# Patient Record
Sex: FEMALE | Race: BLACK OR AFRICAN AMERICAN | Employment: UNEMPLOYED | URBAN - METROPOLITAN AREA
[De-identification: names, ages, dates, MRNs, and addresses within clinical notes are randomized per-mention and may not be internally consistent; named-entity substitution may affect disease eponyms.]

---

## 2017-08-10 ENCOUNTER — HOSPITAL ENCOUNTER (OUTPATIENT)
Dept: SLEEP MEDICINE | Age: 37
Discharge: HOME | End: 2017-08-10
Payer: COMMERCIAL

## 2017-08-10 PROCEDURE — 95811 POLYSOM 6/>YRS CPAP 4/> PARM: CPT

## 2017-09-13 ENCOUNTER — HOSPITAL ENCOUNTER (OUTPATIENT)
Dept: ULTRASOUND IMAGING | Age: 37
Discharge: HOME | End: 2017-09-13
Attending: FAMILY MEDICINE
Payer: COMMERCIAL

## 2017-09-13 DIAGNOSIS — E04.9 GOITER: ICD-10-CM

## 2017-09-13 PROCEDURE — 76536 US EXAM OF HEAD AND NECK: CPT

## 2017-09-13 NOTE — PROGRESS NOTES
Please tell patient that her thyroid ultrasound showed nodules and they need further evaluation. I have placed referral to specialist for it.     Felipe Chin MD

## 2017-09-20 PROBLEM — R94.31 ABNORMAL EKG: Status: ACTIVE | Noted: 2017-09-20

## 2017-09-20 PROBLEM — R55 SYNCOPE: Status: ACTIVE | Noted: 2017-09-20

## 2017-10-03 ENCOUNTER — HOSPITAL ENCOUNTER (OUTPATIENT)
Dept: ULTRASOUND IMAGING | Age: 37
Discharge: HOME | End: 2017-10-03
Attending: INTERNAL MEDICINE
Payer: COMMERCIAL

## 2017-10-03 DIAGNOSIS — E04.2 MULTINODULAR GOITER: ICD-10-CM

## 2017-10-03 PROCEDURE — 88173 CYTOPATH EVAL FNA REPORT: CPT | Performed by: INTERNAL MEDICINE

## 2017-10-03 PROCEDURE — 10022 US GUIDE FINE NDL ASP THYROID: CPT

## 2017-10-03 PROCEDURE — 74011000250 HC RX REV CODE- 250: Performed by: PHYSICIAN ASSISTANT

## 2017-10-03 PROCEDURE — 88305 TISSUE EXAM BY PATHOLOGIST: CPT | Performed by: INTERNAL MEDICINE

## 2017-10-03 PROCEDURE — 60100 BIOPSY OF THYROID: CPT

## 2017-10-03 RX ORDER — LIDOCAINE HYDROCHLORIDE 20 MG/ML
.5-2 INJECTION, SOLUTION INFILTRATION; PERINEURAL ONCE
Status: COMPLETED | OUTPATIENT
Start: 2017-10-03 | End: 2017-10-03

## 2017-10-03 RX ADMIN — LIDOCAINE HYDROCHLORIDE 100 MG: 20 INJECTION, SOLUTION INFILTRATION; PERINEURAL at 11:12

## 2017-10-03 NOTE — DISCHARGE INSTRUCTIONS
[address]  Department of Interventional Radiology  [facility]  [phone] Office  [phone] Fax    BIOPSY DISCHARGE INSTRUCTIONS    General Instructions: Thyroid Biopsy     A biopsy is the removal of a small piece of tissue for microscopic examination or testing. Healthy tissue can be obtained for the purpose of tissue-type matching for transplants. Unhealthy tissues are more commonly biopsied to diagnose disease. Lung Biopsy:     A needle lung biopsy is performed when there is a mass discovered in the lung area. The most serious risk is infection, bleeding, and pneumothorax (a collapsed lung). Signs of pneumothorax include shortness of breath, rapid heart rate, and blueness of the skin. If any of these occur, call 911. Liver Biopsy: This test helps detect cancer, infections, and the cause of an enlargement of the liver or elevated liver enzymes. It also helps to diagnose a number of liver diseases. The pain associated with the procedure may be felt in the shoulder. The risks include internal bleeding, pneumothorax, and injury to the surrounding organs. Renal Biopsy: This procedure is sometimes done to evaluate a transplanted kidney. It is also used to evaluate unexplained decrease in kidney function, blood, or protein in the urine. You may see bright red blood in the urine the first 24 hours after the test. If the bleeding lasts longer, you need to call your doctor. There is a risk of infection and bleeding into the muscle, which may cause soreness. Spinal Biopsy: This test is sometimes done in conjunction with other procedures. Your back will be sore, as we are taking a small sample of bone, which is slightly more difficult to sample than tissue. General Biopsy:     A mass can grow in any area of the body, and we are taking a specimen as ordered by your doctor. The risks are the same.  They include bleeding, pain, and infection. Home Care Instructions: You may resume your regular diet and medication regimen. Do not drink alcohol, drive, or make any important legal decisions in the next 24 hours. Do not lift anything heavier than a gallon of milk until the soreness goes away. You may use over the counter acetaminophen or ibuprofen for the soreness. You may apply an ice pack to the affected area for 20-30 minutes at time for the first 24 hours. After that, you may apply a heat pack. Call If: You should call your Physician and/or the Radiology Nurse if you have any questions or concerns about the biopsy site. Call if you should have increased pain, fever, redness, drainage, or bleeding more than a small spot on the bandage. Follow-Up Instructions: Please see your ordering doctor as he/she has requested. To Reach Us: If you have any questions about your procedure, please call the Interventional Radiology department at [phone]. After business hours (5pm) and weekends, call the answering service at [phone] and ask for the Radiologist on call to be paged. [address] Interventional Radiology General Nurse Discharge    After general anesthesia or intravenous sedation, for 24 hours or while taking prescription Narcotics:  · Limit your activities  · Do not drive and operate hazardous machinery  · Do not make important personal or business decisions  · Do  not drink alcoholic beverages  · If you have not urinated within 8 hours after discharge, please contact your surgeon on call. * Please give a list of your current medications to your Primary Care Provider. * Please update this list whenever your medications are discontinued, doses are     changed, or new medications (including over-the-counter products) are added. * Please carry medication information at all times in case of emergency situations.     These are general instructions for a healthy lifestyle:    No smoking/ No tobacco products/ Avoid exposure to second hand smoke  Surgeon General's Warning:  Quitting smoking now greatly reduces serious risk to your health. Obesity, smoking, and sedentary lifestyle greatly increases your risk for illness  A healthy diet, regular physical exercise & weight monitoring are important for maintaining a healthy lifestyle    You may be retaining fluid if you have a history of heart failure or if you experience any of the following symptoms:  Weight gain of 3 pounds or more overnight or 5 pounds in a week, increased swelling in our hands or feet or shortness of breath while lying flat in bed. Please call your doctor as soon as you notice any of these symptoms; do not wait until your next office visit. Recognize signs and symptoms of STROKE:  F-face looks uneven    A-arms unable to move or move unevenly    S-speech slurred or non-existent    T-time-call 911 as soon as signs and symptoms begin-DO NOT go       Back to bed or wait to see if you get better-TIME IS BRAIN.             Patient Signature:  Date: 10/3/2017  Discharging Nurse: Henry Espinosa

## 2017-10-03 NOTE — IP AVS SNAPSHOT
Mireya Carreno 
 
 
 [address] 
[phone] Patient: Jolene Lai MRN: [id number] UBY:[birth year] You are allergic to the following No active allergies Recent Documentation Smoking Status Never Smoker Emergency Contacts Name Discharge Info Relation Home Work Mobile Rachel Morales  Mother [14] [phone] Regina Moran  Other Relative [6] [phone] Sabrina Berumen  Other Relative [6] [phone] About your hospitalization You were admitted on:  October 3, 2017 You last received care in the:  [facility] RAD[facility] ULTRASOUND You were discharged on:  October 3, 2017 Unit phone number:  [phone] Why you were hospitalized Your primary diagnosis was:  Not on File Providers Seen During Your Hospitalizations Provider Role Specialty Primary office phone Godwin Drummond MD Attending Provider Endocrinology [phone] Your Primary Care Physician (PCP) Primary Care Physician Office Phone Office Fax [address] [phone] Follow-up Information None Your Appointments Tuesday October 03, 2017 11:00 AM EDT  
US GUIDED BIOPSY NECK THYROID PRC NDL with D US LOGIC 7 UNIT 2, SFD NURSING, SFD IR PA RESOURCE 2  
[facility] RADIOLOGY ULTRASOUND ([address] [place] [address]) [address]  
[phone] Interventional Radiology procedure completed in Ultrasound. Referring Physicians: 1) Fax H&P/recent office notes and lab work, no older than 30 days (CBC, BMP, PT/PTT) to Interventional Radiology to facilitate prompt scheduling.  2)Patients with contrast dye allergies must be pre medicated prior to arrival. 3) Obtain clearance to hold blood thinners from prescribing Physician and give Patient instructions prior to arrival. 4)Hold oral diabetic medications the day of the procedure. If Insulin is required, take 1/2 dose the day of the procedure. 5) Pt should not eat or drink anything past midnight 6) Pt to arrive 1 hour to 1.5 hours early depending upon sedation method. 7) Responsible adult  required to drive Patient home after recovery period. Recovery period can vary depending on sedation and patient condition. 8) Requires approval from Radiologist prior to scheduling 9) Interventional Radiology Scheduling can be contacted at [phone] Wednesday October 04, 2017  9:20 AM EDT Return appointment with [address] [place] [address] [address] ([facility]) [facility] [address] [place] [address] [facility]  
[phone] Friday October 06, 2017 10:15 AM EDT  
US HEAD NECK SOFT TISSUE with SFD US LOGIC 7 UNIT 1  
[facility] RADIOLOGY ULTRASOUND ([address] [place] [address]) [address] [place] [address]  
[phone] PATIENT ARRIVAL Please report 30 minutes early to check in. Monday October 16, 2017  8:30 AM EDT  
ECHOCARDIOGRAM with ECHO [address] [facility] CARDIOLOGY [place] OFFICE ([address] [place] [address]) [address] [place]  
[address] [place]  
[phone] Monday October 23, 2017  8:15 AM EDT Office Visit with Lauren Parnell DO  
[facility] CARDIOLOGY [place] OFFICE ([address] [place] [address]) [address] 
[address] [place] [address]  
[phone] Tuesday October 24, 2017  9:15 AM EDT Wellness Visit (26-64) with Daryl Calabrese MD  
[address] [facility] ([address] [place] [address]) [address] [place] [address]  
[phone] Current Discharge Medication List  
  
ASK your doctor about these medications Dose & Instructions Dispensing Information Comments Morning Noon Evening Bedtime  
 ferrous sulfate 325 mg (65 mg iron) EC tablet Commonly known as:  IRON Your last dose was: Your next dose is:    
   
   
 Dose:  325 mg Take 1 Tab by mouth two (2) times a day. Quantity:  60 Tab Refills:  3 Discharge Instructions [place] [address] Department of Interventional Radiology [facility] 
[phone] Office 
[phone] Fax BIOPSY DISCHARGE INSTRUCTIONS General Instructions: Thyroid Biopsy A biopsy is the removal of a small piece of tissue for microscopic examination or testing. Healthy tissue can be obtained for the purpose of tissue-type matching for transplants. Unhealthy tissues are more commonly biopsied to diagnose disease. Lung Biopsy: A needle lung biopsy is performed when there is a mass discovered in the lung area. The most serious risk is infection, bleeding, and pneumothorax (a collapsed lung). Signs of pneumothorax include shortness of breath, rapid heart rate, and blueness of the skin. If any of these occur, call 911. Liver Biopsy: This test helps detect cancer, infections, and the cause of an enlargement of the liver or elevated liver enzymes. It also helps to diagnose a number of liver diseases. The pain associated with the procedure may be felt in the shoulder. The risks include internal bleeding, pneumothorax, and injury to the surrounding organs. Renal Biopsy: This procedure is sometimes done to evaluate a transplanted kidney. It is also used to evaluate unexplained decrease in kidney function, blood, or protein in the urine. You may see bright red blood in the urine the first 24 hours after the test. If the bleeding lasts longer, you need to call your doctor. There is a risk of infection and bleeding into the muscle, which may cause soreness. Spinal Biopsy: This test is sometimes done in conjunction with other procedures.  Your back will be sore, as we are taking a small sample of bone, which is slightly more difficult to sample than tissue. General Biopsy: 
   A mass can grow in any area of the body, and we are taking a specimen as ordered by your doctor. The risks are the same. They include bleeding, pain, and infection. Home Care Instructions: You may resume your regular diet and medication regimen. Do not drink alcohol, drive, or make any important legal decisions in the next 24 hours. Do not lift anything heavier than a gallon of milk until the soreness goes away. You may use over the counter acetaminophen or ibuprofen for the soreness. You may apply an ice pack to the affected area for 20-30 minutes at time for the first 24 hours. After that, you may apply a heat pack. Call If: You should call your Physician and/or the Radiology Nurse if you have any questions or concerns about the biopsy site. Call if you should have increased pain, fever, redness, drainage, or bleeding more than a small spot on the bandage. Follow-Up Instructions: Please see your ordering doctor as he/she has requested. To Reach Us: If you have any questions about your procedure, please call the Interventional Radiology department at [phone]. After business hours (5pm) and weekends, call the answering service at [phone] and ask for the Radiologist on call to be paged. 1102 [facility] [address] Department of Interventional Radiology [address] [place] [address] [address] [facility] 
[phone] Office 
[phone] Fax TRANSJUGULAR BIOPSY DISCHARGE INSTRUCTIONS General Information: The transjugular liver biopsy is a procedure that is done to obtain a liver biopsy through the portal vein. A biopsy is the removal of a small piece of tissue for microscopic examination or testing. Healthy tissue can be obtained for the purpose of tissue-type matching for transplants. Unhealthy tissues are more commonly biopsied to diagnose disease. The liver biopsy helps detect cancer, infections, and the cause of an enlargement of the liver or elevated liver enzymes. It also helps to diagnose a number of liver diseases. Home Care Instructions: You can resume your regular diet and medication regimen. Do not drink alcohol, drive, or make any important legal decisions in the next 24 hours. Do not lift anything heavier than a gallon of milk, or do anything strenuous for the next 24 hours. You will notice a dressing on your neck. This was the site of the insertion for the procedure. This dressing may be removed in 24 hours. You may take a shower after the bandage comes off. Do not take a bath, swim or immerse yourself in water until the wound on your neck has completely healed. Call If: 
 You should call your Physician and/or Radiology Nurse if you have any bleeding other than a small spot on your bandage. Call if you have any signs of infection, fever, or increased pain at the site. Call if you have any questions of how to take care of your wound. Call if you notice your abdomen swelling. You may still have to come in for the paracentesis, but you should not have to come in as often. Follow-Up Instructions: Please see your ordering doctor as he/she has requested. To Reach Us: Interventional Radiology General Nurse Discharge After general anesthesia or intravenous sedation, for 24 hours or while taking prescription Narcotics: · Limit your activities · Do not drive and operate hazardous machinery · Do not make important personal or business decisions · Do  not drink alcoholic beverages · If you have not urinated within 8 hours after discharge, please contact your surgeon on call. * Please give a list of your current medications to your Primary Care Provider.  
* Please update this list whenever your medications are discontinued, doses are 
 changed, or new medications (including over-the-counter products) are added. * Please carry medication information at all times in case of emergency situations. These are general instructions for a healthy lifestyle: No smoking/ No tobacco products/ Avoid exposure to second hand smoke Surgeon General's Warning:  Quitting smoking now greatly reduces serious risk to your health. Obesity, smoking, and sedentary lifestyle greatly increases your risk for illness A healthy diet, regular physical exercise & weight monitoring are important for maintaining a healthy lifestyle You may be retaining fluid if you have a history of heart failure or if you experience any of the following symptoms:  Weight gain of 3 pounds or more overnight or 5 pounds in a week, increased swelling in our hands or feet or shortness of breath while lying flat in bed. Please call your doctor as soon as you notice any of these symptoms; do not wait until your next office visit. Recognize signs and symptoms of STROKE: 
F-face looks uneven A-arms unable to move or move unevenly S-speech slurred or non-existent T-time-call 911 as soon as signs and symptoms begin-DO NOT go Back to bed or wait to see if you get better-TIME IS BRAIN. Patient Signature: 
Date: 10/3/2017 Discharging Nurse: Popeye Lovell Discharge Orders None Introducing [facility] SERVICES! Dear Haritha Finn: Thank you for requesting a [employer] account. Our records indicate that you already have an active [employer] account. You can access your account anytime at [email or web address] Did you know that you can access your hospital and ER discharge instructions at any time in [employer]? You can also review all of your test results from your hospital stay or ER visit. Additional Information If you have questions, please visit the Frequently Asked Questions section of the [employer] website at [email or web address] Remember, MyChart is NOT to be used for urgent needs. For medical emergencies, dial 911. Now available from your iPhone and Android! General Information Please provide this summary of care documentation to your next provider. Patient Signature:  ____________________________________________________________ Date:  ____________________________________________________________  
  
Janyth Mins Provider Signature:  ____________________________________________________________ Date:  ____________________________________________________________

## 2017-10-03 NOTE — IP AVS SNAPSHOT
Akila Reeves 
 
 
 [address] 
[phone] Patient: Janeth Reynolds MRN: [id number] OXK:[birth year] Current Discharge Medication List  
  
ASK your doctor about these medications Dose & Instructions Dispensing Information Comments Morning Noon Evening Bedtime  
 ferrous sulfate 325 mg (65 mg iron) EC tablet Commonly known as:  IRON Your last dose was: Your next dose is:    
   
   
 Dose:  325 mg Take 1 Tab by mouth two (2) times a day. Quantity:  60 Tab Refills:  3

## 2017-10-03 NOTE — PROGRESS NOTES
Recovery period without difficulty. Pt alert and oriented and denies pain. Dressing is clean, dry, and intact. Reviewed discharge instructions with patient and she verbalized understanding. Pt escorted to lobby discharge area.

## 2017-10-03 NOTE — PROGRESS NOTES
Spoke to Brittni in [facility], told her we are ready with thyroid biopsy

## 2017-10-04 PROBLEM — Z72.821 POOR SLEEP HYGIENE: Status: ACTIVE | Noted: 2017-10-04

## 2017-10-04 PROBLEM — G47.10 HYPERSOMNIA: Status: ACTIVE | Noted: 2017-10-04

## 2017-10-04 PROBLEM — R09.02 HYPOXIA: Status: ACTIVE | Noted: 2017-10-04

## 2017-10-06 ENCOUNTER — HOSPITAL ENCOUNTER (OUTPATIENT)
Dept: ULTRASOUND IMAGING | Age: 37
Discharge: HOME | End: 2017-10-06
Attending: INTERNAL MEDICINE
Payer: COMMERCIAL

## 2017-10-06 DIAGNOSIS — E04.2 MULTINODULAR GOITER: ICD-10-CM

## 2017-10-06 PROCEDURE — 76536 US EXAM OF HEAD AND NECK: CPT

## 2018-08-03 ENCOUNTER — HOSPITAL ENCOUNTER (OUTPATIENT)
Dept: ULTRASOUND IMAGING | Age: 38
Discharge: HOME | End: 2018-08-03
Attending: INTERNAL MEDICINE
Payer: COMMERCIAL

## 2018-08-03 DIAGNOSIS — E04.2 MULTINODULAR GOITER: ICD-10-CM

## 2018-08-03 PROCEDURE — 76536 US EXAM OF HEAD AND NECK: CPT

## 2018-11-27 ENCOUNTER — HOSPITAL ENCOUNTER (OUTPATIENT)
Dept: MRI IMAGING | Age: 38
Discharge: HOME | End: 2018-11-27
Attending: PSYCHIATRY & NEUROLOGY
Payer: COMMERCIAL

## 2018-11-27 DIAGNOSIS — R56.9 SEIZURE (HCC): ICD-10-CM

## 2018-11-27 PROCEDURE — 70551 MRI BRAIN STEM W/O DYE: CPT

## 2019-07-22 ENCOUNTER — HOSPITAL ENCOUNTER (OUTPATIENT)
Dept: ULTRASOUND IMAGING | Age: 39
Discharge: HOME | End: 2019-07-22
Attending: INTERNAL MEDICINE
Payer: COMMERCIAL

## 2019-07-22 DIAGNOSIS — E04.2 MULTINODULAR GOITER: ICD-10-CM

## 2019-07-22 PROCEDURE — 76536 US EXAM OF HEAD AND NECK: CPT

## 2020-10-06 PROBLEM — D50.8 IRON DEFICIENCY ANEMIA SECONDARY TO INADEQUATE DIETARY IRON INTAKE: Status: ACTIVE | Noted: 2020-10-06

## 2020-10-06 PROBLEM — E78.2 MIXED HYPERLIPIDEMIA: Status: ACTIVE | Noted: 2020-01-29

## 2020-10-06 PROBLEM — R94.31 ABNORMAL EKG: Status: RESOLVED | Noted: 2017-09-20 | Resolved: 2020-10-06

## 2020-10-06 PROBLEM — R09.02 HYPOXIA: Status: RESOLVED | Noted: 2017-10-04 | Resolved: 2020-10-06

## 2020-10-06 PROBLEM — Z72.821 POOR SLEEP HYGIENE: Status: RESOLVED | Noted: 2017-10-04 | Resolved: 2020-10-06

## 2020-10-06 PROBLEM — R55 SYNCOPE: Status: RESOLVED | Noted: 2017-09-20 | Resolved: 2020-10-06

## 2020-10-06 PROBLEM — G47.10 HYPERSOMNIA: Status: RESOLVED | Noted: 2017-10-04 | Resolved: 2020-10-06

## 2020-11-17 PROBLEM — R73.01 IFG (IMPAIRED FASTING GLUCOSE): Status: ACTIVE | Noted: 2020-11-17

## 2021-05-28 ENCOUNTER — HOSPITAL ENCOUNTER (OUTPATIENT)
Dept: MAMMOGRAPHY | Age: 41
Discharge: HOME | End: 2021-05-28
Attending: INTERNAL MEDICINE
Payer: COMMERCIAL

## 2021-05-28 DIAGNOSIS — Z12.31 VISIT FOR SCREENING MAMMOGRAM: ICD-10-CM

## 2021-05-28 PROCEDURE — 77067 SCR MAMMO BI INCL CAD: CPT

## 2021-11-23 PROBLEM — R73.03 PREDIABETES: Status: ACTIVE | Noted: 2020-11-17

## 2022-03-18 PROBLEM — D50.8 IRON DEFICIENCY ANEMIA SECONDARY TO INADEQUATE DIETARY IRON INTAKE: Status: ACTIVE | Noted: 2020-10-06

## 2022-03-19 PROBLEM — E78.2 MIXED HYPERLIPIDEMIA: Status: ACTIVE | Noted: 2020-01-29

## 2022-03-19 PROBLEM — R73.03 PREDIABETES: Status: ACTIVE | Noted: 2020-11-17
